# Patient Record
Sex: MALE | Race: BLACK OR AFRICAN AMERICAN | NOT HISPANIC OR LATINO | URBAN - METROPOLITAN AREA
[De-identification: names, ages, dates, MRNs, and addresses within clinical notes are randomized per-mention and may not be internally consistent; named-entity substitution may affect disease eponyms.]

---

## 2024-10-16 ENCOUNTER — INPATIENT (INPATIENT)
Facility: HOSPITAL | Age: 64
LOS: 0 days | Discharge: HOME | DRG: 310 | End: 2024-10-17
Attending: STUDENT IN AN ORGANIZED HEALTH CARE EDUCATION/TRAINING PROGRAM | Admitting: STUDENT IN AN ORGANIZED HEALTH CARE EDUCATION/TRAINING PROGRAM
Payer: COMMERCIAL

## 2024-10-16 VITALS
WEIGHT: 265 LBS | RESPIRATION RATE: 18 BRPM | SYSTOLIC BLOOD PRESSURE: 129 MMHG | HEART RATE: 180 BPM | DIASTOLIC BLOOD PRESSURE: 86 MMHG | TEMPERATURE: 98 F | OXYGEN SATURATION: 98 %

## 2024-10-16 DIAGNOSIS — I10 ESSENTIAL (PRIMARY) HYPERTENSION: ICD-10-CM

## 2024-10-16 DIAGNOSIS — I48.91 UNSPECIFIED ATRIAL FIBRILLATION: ICD-10-CM

## 2024-10-16 LAB
ADD ON TEST-SPECIMEN IN LAB: SIGNIFICANT CHANGE UP
ANION GAP SERPL CALC-SCNC: 11 MMOL/L — SIGNIFICANT CHANGE UP (ref 5–17)
BUN SERPL-MCNC: 16 MG/DL — SIGNIFICANT CHANGE UP (ref 7–23)
CALCIUM SERPL-MCNC: 9 MG/DL — SIGNIFICANT CHANGE UP (ref 8.4–10.5)
CHLORIDE SERPL-SCNC: 105 MMOL/L — SIGNIFICANT CHANGE UP (ref 96–108)
CO2 SERPL-SCNC: 24 MMOL/L — SIGNIFICANT CHANGE UP (ref 22–31)
CREAT SERPL-MCNC: 1.27 MG/DL — SIGNIFICANT CHANGE UP (ref 0.5–1.3)
EGFR: 64 ML/MIN/1.73M2 — SIGNIFICANT CHANGE UP
GLUCOSE SERPL-MCNC: 99 MG/DL — SIGNIFICANT CHANGE UP (ref 70–99)
HCT VFR BLD CALC: 42.6 % — SIGNIFICANT CHANGE UP (ref 39–50)
HGB BLD-MCNC: 14.2 G/DL — SIGNIFICANT CHANGE UP (ref 13–17)
MAGNESIUM SERPL-MCNC: 2 MG/DL — SIGNIFICANT CHANGE UP (ref 1.6–2.6)
MCHC RBC-ENTMCNC: 30.1 PG — SIGNIFICANT CHANGE UP (ref 27–34)
MCHC RBC-ENTMCNC: 33.3 GM/DL — SIGNIFICANT CHANGE UP (ref 32–36)
MCV RBC AUTO: 90.4 FL — SIGNIFICANT CHANGE UP (ref 80–100)
NRBC # BLD: 0 /100 WBCS — SIGNIFICANT CHANGE UP (ref 0–0)
PLATELET # BLD AUTO: 253 K/UL — SIGNIFICANT CHANGE UP (ref 150–400)
POTASSIUM SERPL-MCNC: 3.7 MMOL/L — SIGNIFICANT CHANGE UP (ref 3.5–5.3)
POTASSIUM SERPL-SCNC: 3.7 MMOL/L — SIGNIFICANT CHANGE UP (ref 3.5–5.3)
RBC # BLD: 4.71 M/UL — SIGNIFICANT CHANGE UP (ref 4.2–5.8)
RBC # FLD: 13.1 % — SIGNIFICANT CHANGE UP (ref 10.3–14.5)
SODIUM SERPL-SCNC: 140 MMOL/L — SIGNIFICANT CHANGE UP (ref 135–145)
TROPONIN T, HIGH SENSITIVITY RESULT: <6 NG/L — SIGNIFICANT CHANGE UP (ref 0–51)
WBC # BLD: 7.24 K/UL — SIGNIFICANT CHANGE UP (ref 3.8–10.5)
WBC # FLD AUTO: 7.24 K/UL — SIGNIFICANT CHANGE UP (ref 3.8–10.5)

## 2024-10-16 PROCEDURE — 99223 1ST HOSP IP/OBS HIGH 75: CPT

## 2024-10-16 PROCEDURE — 99291 CRITICAL CARE FIRST HOUR: CPT

## 2024-10-16 PROCEDURE — 71045 X-RAY EXAM CHEST 1 VIEW: CPT | Mod: 26

## 2024-10-16 RX ORDER — METOPROLOL TARTRATE 50 MG
5 TABLET ORAL ONCE
Refills: 0 | Status: COMPLETED | OUTPATIENT
Start: 2024-10-16 | End: 2024-10-16

## 2024-10-16 RX ORDER — METOPROLOL TARTRATE 50 MG
25 TABLET ORAL EVERY 8 HOURS
Refills: 0 | Status: DISCONTINUED | OUTPATIENT
Start: 2024-10-16 | End: 2024-10-17

## 2024-10-16 RX ORDER — APIXABAN 5 MG/1
5 TABLET, FILM COATED ORAL EVERY 12 HOURS
Refills: 0 | Status: DISCONTINUED | OUTPATIENT
Start: 2024-10-16 | End: 2024-10-17

## 2024-10-16 RX ORDER — AMLODIPINE BESYLATE 5 MG
5 TABLET ORAL DAILY
Refills: 0 | Status: DISCONTINUED | OUTPATIENT
Start: 2024-10-17 | End: 2024-10-17

## 2024-10-16 RX ORDER — METOPROLOL TARTRATE 50 MG
50 TABLET ORAL ONCE
Refills: 0 | Status: COMPLETED | OUTPATIENT
Start: 2024-10-16 | End: 2024-10-16

## 2024-10-16 RX ORDER — MAGNESIUM SULFATE 500 MG/ML
4 VIAL (ML) INJECTION ONCE
Refills: 0 | Status: DISCONTINUED | OUTPATIENT
Start: 2024-10-16 | End: 2024-10-16

## 2024-10-16 RX ADMIN — Medication 50 MILLIGRAM(S): at 18:39

## 2024-10-16 RX ADMIN — Medication 40 MILLIEQUIVALENT(S): at 20:42

## 2024-10-16 RX ADMIN — Medication 25 MILLIGRAM(S): at 23:01

## 2024-10-16 RX ADMIN — APIXABAN 5 MILLIGRAM(S): 5 TABLET, FILM COATED ORAL at 21:00

## 2024-10-16 RX ADMIN — Medication 5 MILLIGRAM(S): at 18:00

## 2024-10-16 NOTE — ED PROVIDER NOTE - CLINICAL SUMMARY MEDICAL DECISION MAKING FREE TEXT BOX
Pt w palpitation sensation intermittently x 2 wk, new afib found at card office today.  Pt w rapid afib on arrival; pt given metoprolol w minimal change in hr - ordered for po and will also try magnesium.  Pt discussed and accepted by card fellow; per fellow, will hold on ac at this time (arnold-vasc 0).  Labs w neg trop, cxr wnl.  Will admit.

## 2024-10-16 NOTE — H&P ADULT - PROBLEM SELECTOR PLAN 1
- EKG (10/16/24): __________________  - AC: ____________  - Rate-control: s/p Lopressor 5 mg IVP x1 + Lopressor 50 mg POx1   - Cardiac telemetry, pulse oximetry, VS q4h    DVT ppx:  F: ____   E: Keep K > 4, Mg > 2  N: DASH/TLC     Code: Full  Dispo: pending clinical progression Currently A.fib 100-140s, aymptomatic. [id number] 2  - EKG 10/16/24: Afib w/ RVR @166 bpm w/ PVCs.   - AC: Eliquis 5mg BID  - Rate control: Lopressor 25mg Q8h  - TTE in AM  - EP consult in AM

## 2024-10-16 NOTE — H&P ADULT - NSHPLABSRESULTS_GEN_ALL_CORE
14.2   7.24  )-----------( 253      ( 16 Oct 2024 18:13 )             42.6       10-16    140  |  105  |  16  ----------------------------<  99  3.7   |  24  |  1.27    Ca    9.0      16 Oct 2024 18:13  Mg     2.0     10-16        Urinalysis Basic - ( 16 Oct 2024 18:13 )    Color: x / Appearance: x / SG: x / pH: x  Gluc: 99 mg/dL / Ketone: x  / Bili: x / Urobili: x   Blood: x / Protein: x / Nitrite: x   Leuk Esterase: x / RBC: x / WBC x   Sq Epi: x / Non Sq Epi: x / Bacteria: x

## 2024-10-16 NOTE — ED PROVIDER NOTE - PHYSICAL EXAMINATION
VITAL SIGNS: I have reviewed nursing notes and confirm.  CONSTITUTIONAL:  in no acute distress.   SKIN:  warm and dry, no acute rash.   HEAD:  normocephalic, atraumatic.  EYES: PERRL, EOM intact; conjunctiva and sclera clear.  ENT: No nasal discharge; airway clear.   NECK: Supple; non tender.  CARD: S1, S2 normal; no murmurs, gallops, or rubs. Rapid, irreg irregular rate and rhythm.   RESP:  Clear to auscultation b/l, no wheezes, rales or rhonchi.  ABD: Normal bowel sounds; soft; non-distended; non-tender; no guarding/ rebound.  MSK: Normal ROM. No clubbing, cyanosis or edema. no ttp bilat le  NEURO: Alert, oriented, grossly unremarkable other than facial asymmetry (baseline per pt)  PSYCH: Cooperative, mood and affect appropriate.

## 2024-10-16 NOTE — ED PROVIDER NOTE - PROGRESS NOTE DETAILS
Pt discussed w Dr Ugalde ([facility]) who requests card svc admit.  Pt discussed and accepted by card fellow.

## 2024-10-16 NOTE — H&P ADULT - ASSESSMENT
64 y/o M with PMHx ___________ admitted for new-onset AFIB w/ RVR. 64 y/o M with significant +FHx of HF (brother & mother) and with PMHx HTN and preDM who presented to [facility] ED from outpt cardiologist office for for new onset a.fib, now admitted to cardiac tele for further management  64 y/o M with significant +FHx of HF (brother & mother) and with PMHx HTN and preDM who presented to [facility] ED from outpt cardiologist office for for new onset a.fib, now admitted to cardiac tele for further management.

## 2024-10-16 NOTE — PATIENT PROFILE ADULT - FALL HARM RISK - HARM RISK INTERVENTIONS

## 2024-10-16 NOTE — H&P ADULT - NEUROLOGICAL
Baseline R mouth droop when speaking/sensation intact/responds to verbal commands/no spontaneous movement

## 2024-10-16 NOTE — H&P ADULT - CARDIOVASCULAR
normal/S1 S2 present/no gallops/no rub/no murmur/irregular rate and rhythm/Irregularly irregular rhythm/tachycardia/vascular

## 2024-10-16 NOTE — ED PROVIDER NOTE - NS ED MD DISPO SPECIAL CONSIDERATION1
Care Management Follow Up    Length of Stay (days): 24    Expected Discharge Date: 01/01/2022     Concerns to be Addressed: discharge planning      Patient plan of care discussed at interdisciplinary rounds: Yes    Anticipated Discharge Disposition: TCU recommended- TBD   Anticipated Discharge Services: TBD   Anticipated Discharge DME: TBD     Patient/family educated on Medicare website which has current facility and service quality ratings: yes   Education Provided on the Discharge Plan: yes  Patient/Family in Agreement with the Plan: yes    Referrals Placed by CM/SW:   [facility]  [address] [place], [place] [address] [phone]  12/30: SW left vm with admissions to inquire about open beds.     [facility]  [address] [place] [address] [phone]  12/30: SW spoke with Deepthi in admission and they may have open beds on Monday. They have potential discharges over the weekend and can review pt. Admissions requested referral be sent tomorrow afternoon.     [facility]  [address] [place] [address] [phone]  12/30: PRERNA spoke with Deepthi in admissions and shared that the facility may have an open bed later next week. Deepthi requested SW send referral tomorrow afternoon for review.      Private pay costs discussed: Not applicable    Additional Information:  PRERNA following for dispo needs- per pt's med team, pt may be med ready for discharge on Monday. Provider would like pt's blood pressure to be more stable before discharging.     PRERNA met with pt in room to check in and was able to gather TCU choices. At this time, pt has identified 3 facilities and SW encouraged pt to review list again for more choices. Pt shared that he wants to stay near home and will only consider the three choices. SW provided education on TCUs being full and pt shared that he will consider other options if his top choices are not an  option.     SW initiated referrals and will continue to follow for dispo needs.    GERBER Diaz, SW  6B   [facility]  Phone: [phone]  Pager: [phone]     None

## 2024-10-16 NOTE — ED ADULT NURSE NOTE - OBJECTIVE STATEMENT
Presents per PCP for abnormal EKG- observed new afib. Pt notes intermittent feeling of anxiety in recent weeks, attributed to stress. Otherwise, denies LITTLE/SOB/CP/weakness/dizziness.     Pt upgraded from triage with immediate ED RN and MD team at bedside. Placed on CM, EKG obtained, PIV established with labs sent. Medicated per verbal orders as reflected in MAR. Pt remains on CM, resting on stretcher, stable. Maintains rapid afib 110-150 range. Presents per PCP for abnormal EKG- observed new afib. Pt notes intermittent feeling of anxiety in recent weeks, attributed to stress. Otherwise, denies LITTLE/SOB/CP/weakness/dizziness.     Pt upgraded from triage with immediate ED RN and MD team at bedside. Placed on CM, EKG obtained, PIV established with labs sent. Medicated per verbal orders as reflected in MAR. Pt remains on CM, resting on stretcher, stable. Maintains rapid afib 110-150 range.    +Facial asymmetry- pt notes this is his norm 2* nerve issue x 30 years

## 2024-10-16 NOTE — ED PROVIDER NOTE - OBJECTIVE STATEMENT
63-year-old male history of hypertension, ? bells (reports facial asymmetry x yrs) sent by Dr. iKm for new onset  rapid A-fib.  Patient reports intermittent palpitation sensation that he attributed to possible anxiety that started approximately 2 weeks ago.  He was seen by Dr. Kim  on October 10 with a normal evaluation and came back today to her office for an echocardiogram.  During echocardiogram he was noted to have rapid A-fib without symptoms.  Her preliminary echo showed a normal EF with mild left atrial enlargement.  Patient was sent to the ER for evaluation and treatment of new onset A-fib.  Patient denies chest pain, shortness of breath.  No current palpitation sensation.

## 2024-10-16 NOTE — ED ADULT NURSE NOTE - NSFALLUNIVINTERV_ED_ALL_ED
Bed/Stretcher in lowest position, wheels locked, appropriate side rails in place/Call bell, personal items and telephone in reach/Instruct patient to call for assistance before getting out of bed/chair/stretcher/Non-slip footwear applied when patient is off stretcher/[place] to call system/Physically safe environment - no spills, clutter or unnecessary equipment/Purposeful proactive rounding/Room/bathroom lighting operational, light cord in reach

## 2024-10-16 NOTE — H&P ADULT - PROBLEM SELECTOR PLAN 2
Continue amlodipine 5mg and lisinopril 20mg QD    F: None  E: Replete if K<4 or Mag<2  N: DASH Diet  VTEppx: Eliquis  CODE: FULL  Dispo: Cardiac tele  PT: Not indicated

## 2024-10-16 NOTE — H&P ADULT - NSICDXFAMILYHX_GEN_ALL_CORE_FT
FAMILY HISTORY:  Mother  Still living? Unknown  FH: heart failure, Age at diagnosis: Age Unknown    Sibling  Still living? Unknown  FH: heart failure, Age at diagnosis: Age Unknown

## 2024-10-16 NOTE — H&P ADULT - NS ATTEND AMEND GEN_ALL_CORE FT
62 yo man PMHx of HTN who was admitted for new Afib RVR    Assessment  1. New Afib RVR -> converted to NSR  TTE normal  2. HTN    Plan  1. Metop tartrate 25mg PO Q8H  2. Apixaban 5mg BID for systemic embolization prevention  3. Consulted EP  4. Continue home lisinopril and amlodipine    During non face-to-face time, I reviewed relevant portions of the patient’s medical record. During face-to-face time, I took a relevant history and examined the patient. I also explained differential diagnoses, relevant cardiac diagnoses, workup, and management plan, which required a high level of medical decision making. I answered all questions related to the patient's medical conditions.     Gold Singletary M.D.  Attending Cardiologist  [facility]

## 2024-10-16 NOTE — H&P ADULT - HISTORY OF PRESENT ILLNESS
64 y/o M with PMHx __________ who was sent to ED by PCP for new-onset AFIB. Patient reports that he was at his annual PCP appointment when he felt ________________________. Patient ____ denies CP, SOB, dizziness, palpitations, orthopnea/PND, leg swelling, LOC, bleeding, melena/hematochezia, fever, chills, URI symptoms, or recent illness.     ED Course:   - Vitals: /86 mmHg,  bpm, RR 18 breaths/min, Temp 98.2F, spO2 98% on room air.   - Labs: WBC 7.24, H/H 14.2/42.6, , Na 140, K 3.7, BUN/Cr 16/1.27, Mg __________________ HsTrop T <6  - Diagnostics:        o EKG (10/16/24): Afib w/ RVR @166 bpm w/ PVCs  - Interventions: Lopressor 5 mg IVPx1, Lopressor 50 mg POx1  64 y/o M with significant +FHx of HF (brother & mother) and with PMHx HTN and preDM who was sent to ED by PCP for new-onset atrial fibrillation. Pt described a few months of intermittent "anxiety" symptoms with some mild brief episodes of shortness of breath, denies any associated chest pain. He was referred to a cardiologist for further evaluation when today while getting an echocardiogram he was noted to be in new onset a.fib with RVR, asymptomatic He does note of increased stressors at work, as he works as a  at [employer]. No EtOH, illicit drug, or caffeine use. He otherwise denies any palpitations, dizziness, syncope, diaphoresis, fatigue, LE edema, SOB, LITTLE, orthopnea, PND, N/V/D, abd pain, cough, congestion, fever, chills or recent sick contact. He notes of a remote hx of stress test that was reportedly normal.     ED Course:   - Vitals: /86 mmHg,  bpm, RR 18 breaths/min, Temp 98.2F, spO2 98% on room air.   - Labs: WBC 7.24, H/H 14.2/42.6, , Na 140, K 3.7, BUN/Cr 16/1.27, Mg 2.0, HsTrop T <6  - Diagnostics:        o EKG (10/16/24): Afib w/ RVR @166 bpm w/ PVCs  - Interventions: Lopressor 5 mg IVPx1, Lopressor 50 mg POx1     Pt now admitted to cardiac tele for further management of new onset a.fib with RVR.  64 y/o M with significant +FHx of HF (brother & mother) and with PMHx HTN and preDM who was sent to ED by PCP for new-onset atrial fibrillation. Pt described a few months of intermittent "anxiety" symptoms with some mild brief episodes of shortness of breath, denies any associated chest pain. He was referred to a cardiologist for further evaluation when today while getting an echocardiogram he was noted to be in new onset a.fib with RVR, asymptomatic. He does note of increased stressors at work, as he works as a  at [employer]. No EtOH, illicit drug, or caffeine use. He otherwise denies any palpitations, dizziness, syncope, diaphoresis, fatigue, LE edema, SOB, LITTLE, orthopnea, PND, N/V/D, abd pain, cough, congestion, fever, chills or recent sick contact. He notes of a remote hx of stress test that was reportedly normal.     ED Course:   - Vitals: /86 mmHg,  bpm, RR 18 breaths/min, Temp 98.2F, spO2 98% on room air.   - Labs: WBC 7.24, H/H 14.2/42.6, , Na 140, K 3.7, BUN/Cr 16/1.27, Mg 2.0, HsTrop T <6  - Diagnostics:        o EKG (10/16/24): Afib w/ RVR @166 bpm w/ PVCs  - Interventions: Lopressor 5 mg IVPx1, Lopressor 50 mg POx1     Pt now admitted to cardiac tele for further management of new onset a.fib with RVR.

## 2024-10-16 NOTE — ED ADULT NURSE REASSESSMENT NOTE - NS ED NURSE REASSESS COMMENT FT1
Received verbal endorsement from Kobe HEADLEY.  Pt awake, AOX3. Pt denies any complains, denies any pain. Pt denies any pain. What Type Of Note Output Would You Prefer (Optional)?: Bullet Format How Severe Is Your Skin Lesion?: moderate Has Your Skin Lesion Been Treated?: not been treated Is This A New Presentation, Or A Follow-Up?: Skin Lesion

## 2024-10-17 ENCOUNTER — TRANSCRIPTION ENCOUNTER (OUTPATIENT)
Age: 64
End: 2024-10-17

## 2024-10-17 ENCOUNTER — RESULT REVIEW (OUTPATIENT)
Age: 64
End: 2024-10-17

## 2024-10-17 VITALS
TEMPERATURE: 99 F | RESPIRATION RATE: 16 BRPM | SYSTOLIC BLOOD PRESSURE: 122 MMHG | OXYGEN SATURATION: 100 % | DIASTOLIC BLOOD PRESSURE: 79 MMHG | HEART RATE: 52 BPM

## 2024-10-17 LAB
A1C WITH ESTIMATED AVERAGE GLUCOSE RESULT: 5.6 % — SIGNIFICANT CHANGE UP (ref 4–5.6)
ANION GAP SERPL CALC-SCNC: 7 MMOL/L — SIGNIFICANT CHANGE UP (ref 5–17)
APTT BLD: 35.5 SEC — SIGNIFICANT CHANGE UP (ref 24.5–35.6)
BUN SERPL-MCNC: 17 MG/DL — SIGNIFICANT CHANGE UP (ref 7–23)
CALCIUM SERPL-MCNC: 8.8 MG/DL — SIGNIFICANT CHANGE UP (ref 8.4–10.5)
CHLORIDE SERPL-SCNC: 107 MMOL/L — SIGNIFICANT CHANGE UP (ref 96–108)
CHOLEST SERPL-MCNC: 130 MG/DL — SIGNIFICANT CHANGE UP
CO2 SERPL-SCNC: 27 MMOL/L — SIGNIFICANT CHANGE UP (ref 22–31)
CREAT SERPL-MCNC: 1.28 MG/DL — SIGNIFICANT CHANGE UP (ref 0.5–1.3)
EGFR: 63 ML/MIN/1.73M2 — SIGNIFICANT CHANGE UP
ESTIMATED AVERAGE GLUCOSE: 114 MG/DL — SIGNIFICANT CHANGE UP (ref 68–114)
GLUCOSE SERPL-MCNC: 88 MG/DL — SIGNIFICANT CHANGE UP (ref 70–99)
HCT VFR BLD CALC: 39.3 % — SIGNIFICANT CHANGE UP (ref 39–50)
HDLC SERPL-MCNC: 43 MG/DL — SIGNIFICANT CHANGE UP
HGB BLD-MCNC: 13.1 G/DL — SIGNIFICANT CHANGE UP (ref 13–17)
INR BLD: 1.18 — HIGH (ref 0.85–1.16)
LIPID PNL WITH DIRECT LDL SERPL: 74 MG/DL — SIGNIFICANT CHANGE UP
MAGNESIUM SERPL-MCNC: 2.2 MG/DL — SIGNIFICANT CHANGE UP (ref 1.6–2.6)
MCHC RBC-ENTMCNC: 30.8 PG — SIGNIFICANT CHANGE UP (ref 27–34)
MCHC RBC-ENTMCNC: 33.3 GM/DL — SIGNIFICANT CHANGE UP (ref 32–36)
MCV RBC AUTO: 92.3 FL — SIGNIFICANT CHANGE UP (ref 80–100)
NON HDL CHOLESTEROL: 87 MG/DL — SIGNIFICANT CHANGE UP
NRBC # BLD: 0 /100 WBCS — SIGNIFICANT CHANGE UP (ref 0–0)
PLATELET # BLD AUTO: 221 K/UL — SIGNIFICANT CHANGE UP (ref 150–400)
POTASSIUM SERPL-MCNC: 4.2 MMOL/L — SIGNIFICANT CHANGE UP (ref 3.5–5.3)
POTASSIUM SERPL-SCNC: 4.2 MMOL/L — SIGNIFICANT CHANGE UP (ref 3.5–5.3)
PROTHROM AB SERPL-ACNC: 13.8 SEC — HIGH (ref 9.9–13.4)
RBC # BLD: 4.26 M/UL — SIGNIFICANT CHANGE UP (ref 4.2–5.8)
RBC # FLD: 13.4 % — SIGNIFICANT CHANGE UP (ref 10.3–14.5)
SODIUM SERPL-SCNC: 141 MMOL/L — SIGNIFICANT CHANGE UP (ref 135–145)
T4 AB SER-ACNC: 6.13 UG/DL — SIGNIFICANT CHANGE UP (ref 4.5–11.7)
TRIGL SERPL-MCNC: 61 MG/DL — SIGNIFICANT CHANGE UP
TSH SERPL-MCNC: 2.18 UIU/ML — SIGNIFICANT CHANGE UP (ref 0.27–4.2)
WBC # BLD: 6.4 K/UL — SIGNIFICANT CHANGE UP (ref 3.8–10.5)
WBC # FLD AUTO: 6.4 K/UL — SIGNIFICANT CHANGE UP (ref 3.8–10.5)

## 2024-10-17 PROCEDURE — 85027 COMPLETE CBC AUTOMATED: CPT

## 2024-10-17 PROCEDURE — 99222 1ST HOSP IP/OBS MODERATE 55: CPT

## 2024-10-17 PROCEDURE — 93010 ELECTROCARDIOGRAM REPORT: CPT

## 2024-10-17 PROCEDURE — 96374 THER/PROPH/DIAG INJ IV PUSH: CPT

## 2024-10-17 PROCEDURE — 85610 PROTHROMBIN TIME: CPT

## 2024-10-17 PROCEDURE — 80061 LIPID PANEL: CPT

## 2024-10-17 PROCEDURE — 36415 COLL VENOUS BLD VENIPUNCTURE: CPT

## 2024-10-17 PROCEDURE — 93005 ELECTROCARDIOGRAM TRACING: CPT

## 2024-10-17 PROCEDURE — 71045 X-RAY EXAM CHEST 1 VIEW: CPT

## 2024-10-17 PROCEDURE — 99239 HOSP IP/OBS DSCHRG MGMT >30: CPT

## 2024-10-17 PROCEDURE — 84436 ASSAY OF TOTAL THYROXINE: CPT

## 2024-10-17 PROCEDURE — 93306 TTE W/DOPPLER COMPLETE: CPT

## 2024-10-17 PROCEDURE — 80048 BASIC METABOLIC PNL TOTAL CA: CPT

## 2024-10-17 PROCEDURE — 84484 ASSAY OF TROPONIN QUANT: CPT

## 2024-10-17 PROCEDURE — 93306 TTE W/DOPPLER COMPLETE: CPT | Mod: 26

## 2024-10-17 PROCEDURE — 85730 THROMBOPLASTIN TIME PARTIAL: CPT

## 2024-10-17 PROCEDURE — 83036 HEMOGLOBIN GLYCOSYLATED A1C: CPT

## 2024-10-17 PROCEDURE — 84443 ASSAY THYROID STIM HORMONE: CPT

## 2024-10-17 PROCEDURE — 99291 CRITICAL CARE FIRST HOUR: CPT

## 2024-10-17 PROCEDURE — 83735 ASSAY OF MAGNESIUM: CPT

## 2024-10-17 RX ORDER — APIXABAN 5 MG/1
1 TABLET, FILM COATED ORAL
Qty: 60 | Refills: 0
Start: 2024-10-17 | End: 2024-11-15

## 2024-10-17 RX ORDER — DRONEDARONE 400 MG/1
1 TABLET, FILM COATED ORAL
Qty: 60 | Refills: 0
Start: 2024-10-17 | End: 2024-11-15

## 2024-10-17 RX ORDER — DRONEDARONE 400 MG/1
400 TABLET, FILM COATED ORAL
Refills: 0 | Status: DISCONTINUED | OUTPATIENT
Start: 2024-10-17 | End: 2024-10-17

## 2024-10-17 RX ADMIN — APIXABAN 5 MILLIGRAM(S): 5 TABLET, FILM COATED ORAL at 05:41

## 2024-10-17 RX ADMIN — Medication 25 MILLIGRAM(S): at 12:15

## 2024-10-17 RX ADMIN — Medication 25 MILLIGRAM(S): at 05:49

## 2024-10-17 RX ADMIN — DRONEDARONE 400 MILLIGRAM(S): 400 TABLET, FILM COATED ORAL at 16:31

## 2024-10-17 RX ADMIN — Medication 5 MILLIGRAM(S): at 05:41

## 2024-10-17 RX ADMIN — APIXABAN 5 MILLIGRAM(S): 5 TABLET, FILM COATED ORAL at 16:31

## 2024-10-17 NOTE — DISCHARGE NOTE PROVIDER - PROVIDER TOKENS
PROVIDER:[TOKEN:[[id number]],FOLLOWUP:[2 weeks],ESTABLISHEDPATIENT:[T]] PROVIDER:[TOKEN:[[id number]],FOLLOWUP:[1 month]],FREE:[LAST:[Jagger],FIRST:[Lori],PHONE:[phone]],FAX:[(   )    -],ADDRESS:[Cardiologist  [address] [place] [address]],FOLLOWUP:[2 weeks],ESTABLISHEDPATIENT:[T]]

## 2024-10-17 NOTE — DISCHARGE NOTE PROVIDER - NSDCCPTREATMENT_GEN_ALL_CORE_FT
PRINCIPAL PROCEDURE  Procedure: 2D echocardiography  Findings and Treatment: CONCLUSIONS:   1. Patient was in atrial fibrillation during the study with frequent ectopy.   2. Normal left and right ventricular size and systolic function.   3. Trace aortic regurgitation.   4. No evidence of pulmonary hypertension.   5. No pericardial effusion.

## 2024-10-17 NOTE — DISCHARGE NOTE PROVIDER - CARE PROVIDER_API CALL
Lori Kim J  Cardiovascular Disease  [address] [place] [address]  Phone: [phone]  Fax: [phone]  Established Patient  Follow Up Time: 2 weeks   Golden Harper  Cardiac Electrophysiology  [address] [address]  Phone: [phone]  Fax: [phone]  Follow Up Time: 1 month    Lori Kim  Cardiologist  [address] [place] [address]  Phone: [phone]  Fax: (   )    -  Established Patient  Follow Up Time: 2 weeks

## 2024-10-17 NOTE — DISCHARGE NOTE PROVIDER - CARE PROVIDERS DIRECT ADDRESSES
,[email or web address],DirectAddress_Unknown

## 2024-10-17 NOTE — DISCHARGE NOTE PROVIDER - NSDCMRMEDTOKEN_GEN_ALL_CORE_FT
amlodipine-benazepril 5 mg-20 mg oral capsule: 1 cap(s) orally once a day  apixaban 5 mg oral tablet: 1 tab(s) orally every 12 hours   amlodipine-benazepril 5 mg-20 mg oral capsule: 1 cap(s) orally once a day  apixaban 5 mg oral tablet: 1 tab(s) orally every 12 hours  dronedarone 400 mg oral tablet: 1 tab(s) orally 2 times a day

## 2024-10-17 NOTE — CONSULT NOTE ADULT - SUBJECTIVE AND OBJECTIVE BOX
HPI:    63 year old male, history of HTN, pre-DM, who presents with newly diagnosed atrial fibrillation.    Pt has been having intermittent symptoms of "anxiety" for the past few months, usually lasting a few hours and self limiting. Pt saw his PCP for routine follow up and referred pt to see Dr. Kim cardiologist. Had an EKG on 10/10 which was "normal" per the patient. Went to see cardiologist and was found to be in newly diagnosed AF with RVR 160s, referred to ED for management. Given metoprolol 25mg PO q8hr , rates now     PAST MEDICAL & SURGICAL HISTORY:  HTN (hypertension)      Prediabetes          FH: heart failure (Mother, Sibling)        Social History:no smoking, no drugs, no algohol    pertinent home medications:    Inpatient Medications:   amLODIPine   Tablet 5 milliGRAM(s) Oral daily  apixaban 5 milliGRAM(s) Oral every 12 hours  lisinopril 20 milliGRAM(s) Oral daily  metoprolol tartrate 25 milliGRAM(s) Oral every 8 hours      Allergies: No Known Allergies      ROS:   CONSTITUTIONAL: No fever, weight loss + fatigue  EYES: Pt denies  RESPIRATORY: No cough, wheezing, chills or hemoptysis; No Shortness of Breath  CARDIOVASCULAR: see HPI  GASTROINTESTINAL: Pt denies  NEUROLOGICAL: Pt denies  SKIN: Pt denies   PSYCHIATRIC: Pt denies  HEME/LYMPH: Pt denies    PHYSICAL:  T(C): 36.4 (10-17-24 @ 08:37), Max: 37.1 (10-16-24 @ 20:11)  HR: 97 (10-17-24 @ 08:37) (78 - 180)  BP: 128/83 (10-17-24 @ 08:37) (125/81 - 156/77)  RR: 17 (10-17-24 @ 08:37) (16 - 18)  SpO2: 100% (10-17-24 @ 08:37) (98% - 100%)  Wt(kg): --  Appearance: No acute distress, well developed  Eyes: normal appearing conjunctiva, pupils and eyelids  Cardiovascular: Normal S1 S2, No JVD, No murmurs, No edema  Respiratory: Lungs clear to auscultation	bilaterally.  No wheeze, rhonchi, rales noted  Gastrointestinal:  Soft, NT/ND 	  Neurologic:  No deficit noted  Psych: A&Ox3, normal mood/affect  Musculoskeletal: normal gait  Skin: no rash noted, normal color and pigmentation.        LABS:                        13.1   6.40  )-----------( 221      ( 17 Oct 2024 05:30 )             39.3     10-17    141  |  107  |  17  ----------------------------<  88  4.2   |  27  |  1.28    Ca    8.8      17 Oct 2024 05:30  Mg     2.2     10-17      PT/INR - ( 17 Oct 2024 05:30 )   PT: 13.8 sec;   INR: 1.18          PTT - ( 17 Oct 2024 05:30 )  PTT:35.5 sec  TSH  Troponin    EKG:    Telemetry:    ECHO:    Prior EP procedures:    Cath / stress / Cardiac CTa:    Assessment Plan:         HPI:    63 year old male, history of HTN, pre-DM, who presents with newly diagnosed atrial fibrillation.    Pt has been having intermittent symptoms of "anxiety" for the past few months, usually lasting a few hours and self limiting. Pt saw his PCP for routine follow up and referred pt to see Dr. Kim cardiologist. Had an EKG on 10/10 which was "normal" per the patient. Went to see cardiologist and was found to be in newly diagnosed AF with RVR 160s, referred to ED for management. Given metoprolol 25mg PO q8hr , rates now improved in the 80s.    Telemetry AFib with frequent runs of rapid rates that conduct aberrantly leading to runs of what looks like WCT, very irregularly irregular and occurs when rates are rapid, consistent with Breanne's phenomenon    PAST MEDICAL & SURGICAL HISTORY:  HTN (hypertension)      Prediabetes          FH: heart failure (Mother, Sibling)        Social History: no smoking, no drugs, no etoh     pertinent home medications:    Inpatient Medications:   amLODIPine   Tablet 5 milliGRAM(s) Oral daily  apixaban 5 milliGRAM(s) Oral every 12 hours  lisinopril 20 milliGRAM(s) Oral daily  metoprolol tartrate 25 milliGRAM(s) Oral every 8 hours      Allergies: No Known Allergies      ROS:   CONSTITUTIONAL: No fever, weight loss + fatigue  EYES: Pt denies  RESPIRATORY: No cough, wheezing, chills or hemoptysis; No Shortness of Breath  CARDIOVASCULAR: see HPI  GASTROINTESTINAL: Pt denies  NEUROLOGICAL: Pt denies  SKIN: Pt denies   PSYCHIATRIC: Pt denies  HEME/LYMPH: Pt denies    PHYSICAL:  T(C): 36.4 (10-17-24 @ 08:37), Max: 37.1 (10-16-24 @ 20:11)  HR: 97 (10-17-24 @ 08:37) (78 - 180)  BP: 128/83 (10-17-24 @ 08:37) (125/81 - 156/77)  RR: 17 (10-17-24 @ 08:37) (16 - 18)  SpO2: 100% (10-17-24 @ 08:37) (98% - 100%)  Appearance: No acute distress, well developed  Eyes: normal appearing conjunctiva, pupils and eyelids  Cardiovascular: irregularly irregular   Respiratory: Lungs clear to auscultation	bilaterally.  No wheeze, rhonchi, rales noted  Gastrointestinal:  Soft, NT/ND 	  Neurologic:  No deficit noted  Psych: A&Ox3, normal mood/affect  Musculoskeletal: normal gait  Skin: no rash noted, normal color and pigmentation.        LABS:                        13.1   6.40  )-----------( 221      ( 17 Oct 2024 05:30 )             39.3     10-17    141  |  107  |  17  ----------------------------<  88  4.2   |  27  |  1.28    Ca    8.8      17 Oct 2024 05:30  Mg     2.2     10-17      PT/INR - ( 17 Oct 2024 05:30 )   PT: 13.8 sec;   INR: 1.18          PTT - ( 17 Oct 2024 05:30 )  PTT:35.5 sec    ECHO:  1. Patient was in atrial fibrillation during the study with frequent ectopy.   2. Normal left and right ventricular size and systolic function.   3. Trace aortic regurgitation.   4. No evidence of pulmonary hypertension.   5. No pericardial effusion.    Assessment Plan:  63 year old male, history of HTN, pre-DM, who presents with newly diagnosed atrial fibrillation. EKG/tele AF with occasional abberrancy. ECHO WNL.    -keep NPO  -DOAC- pt initiated on eliquis 5mg PO BID  -can dc home on metoprolol  -plan for JACK DCCV. Given frequency of symptoms, will consider initiation of AAD post cardioversion. Pt would benefit from ablation in the near future.

## 2024-10-17 NOTE — DISCHARGE NOTE PROVIDER - NSDCFUADDAPPT_GEN_ALL_CORE_FT
1. Follow up with Electrophysiologist Dr Harper, the office will call you to arrange for an appointment within 4-6 weeks.

## 2024-10-17 NOTE — DISCHARGE NOTE NURSING/CASE MANAGEMENT/SOCIAL WORK - NSDCPEFALRISK_GEN_ALL_CORE
For information on Fall & Injury Prevention, visit: [email or web address] OR  [email or web address] OR  https://www.cdc.gov/steadi/patient.html

## 2024-10-17 NOTE — DISCHARGE NOTE PROVIDER - NSDCCPCAREPLAN_GEN_ALL_CORE_FT
PRINCIPAL DISCHARGE DIAGNOSIS  Diagnosis: Atrial fibrillation with RVR  Assessment and Plan of Treatment:      PRINCIPAL DISCHARGE DIAGNOSIS  Diagnosis: Atrial fibrillation with RVR  Assessment and Plan of Treatment: During your hospitalization, you were found to have a new onset of an arrhythmia called atrial fibrillation (A-fib). You spontaneously converted back to normal rhythm. In people with A-fib, the electrical signals that control the heartbeat are abnormal. As a result, the top 2 chambers of the heart stop pumping effectively, and a small amount of the blood that should move out of these chambers gets left behind. As the blood pools, it can start to form clots. These clots can travel to the brain through the blood vessels, and cause strokes. You were started on a medication called Multaq (dronedarone) to help maintain a normal rhythm, and blood thinner Eliquis to prevent the risk of stroke associated with A-fib. Monitor for signs and symptoms of bleeding such as black or bright red blood in stool, nosebleeds, difficulty stopping bleeding.

## 2024-10-17 NOTE — DISCHARGE NOTE NURSING/CASE MANAGEMENT/SOCIAL WORK - PATIENT PORTAL LINK FT
You can access the FollowMyHealth Patient Portal offered by [facility] by registering at the following website: [email or web address]. By joining [facility]’s FollowMyHealth portal, you will also be able to view your health information using other applications (apps) compatible with our system.

## 2024-10-17 NOTE — DISCHARGE NOTE NURSING/CASE MANAGEMENT/SOCIAL WORK - FINANCIAL ASSISTANCE
[facility] provides services at a reduced cost to those who are determined to be eligible through [facility]’s financial assistance program. Information regarding [facility]’s financial assistance program can be found by going to [email or web address] or by calling [phone].

## 2024-10-17 NOTE — DISCHARGE NOTE NURSING/CASE MANAGEMENT/SOCIAL WORK - NSDCVIVACCINE_GEN_ALL_CORE_FT
No Vaccines Administered.

## 2024-10-17 NOTE — DISCHARGE NOTE PROVIDER - HOSPITAL COURSE
62 y/o M with significant +FHx of HF (brother & mother) and with PMHx HTN and preDM, was sent to ED by cardiologist for new-onset atrial fibrillation RVR. Pt reports feeling a few months of intermittent "anxiety" symptoms with some mild brief episodes of SOB w/ chest fluttering sensation that was worsening x 2 weeks. In ED: EKG w/ Afib w/ RVR @166 bpm w/ PVCs. Hs Trop T neg x1. Admitted to cardiac tele for new onset AFib with RVR. Pt was started on Lopressor 25mg q8h w/ improved HR 80-120s. CHADsVASc 1, started on Eliquis 5mg BID. EP consulted. Initially planned for JACK/DCCV, but pt self converted to SB 50s prior to receiving sedation for JACK.  Per EP, started Multaq 400mg BID given frequency of symptoms. Echo 10/17/24 w/ EF 55-60%, septal knuckle, no RWMA, trace AI.    On the day of discharge, the patient was seen and examined. Symptoms improved. Vital signs are stable. Labs and imaging reviewed. Patient is medically optimized and hemodynamically stable. Return precautions discussed, medication teach back done, and importance of physician followup emphasized. The patient verbalized understanding.   62 y/o M with significant +FHx of HF (brother & mother) and with PMHx HTN and preDM, was sent to ED by cardiologist for new-onset atrial fibrillation RVR. Pt reports feeling a few months of intermittent "anxiety" symptoms with some mild brief episodes of SOB w/ chest fluttering sensation that was worsening x 2 weeks. In ED: EKG w/ Afib w/ RVR @166 bpm w/ PVCs. Hs Trop T neg x1. Admitted to cardiac tele for new onset AFib with RVR. Pt was started on Lopressor 25mg q8h w/ improved HR 80-120s. CHADsVASc 1, started on Eliquis 5mg BID ($45/month) . EP consulted. Initially planned for JACK/DCCV, but pt self converted to SB 50s prior to receiving sedation for JACK.  Per EP, started Multaq 400mg BID ($45/month) given frequency of symptoms. Echo 10/17/24 w/ EF 55-60%, septal knuckle, no RWMA, trace AI.    On the day of discharge, the patient was seen and examined. Symptoms improved. Vital signs are stable. Labs and imaging reviewed. Patient is medically optimized and hemodynamically stable. Return precautions discussed, medication teach back done, and importance of physician followup emphasized. The patient verbalized understanding.

## 2024-10-22 RX ORDER — AMLODIPINE BESYLATE AND BENAZEPRIL HYDROCHLORIDE 10; 20 MG/1; MG/1
1 CAPSULE ORAL
Refills: 0 | DISCHARGE

## 2024-10-23 DIAGNOSIS — I10 ESSENTIAL (PRIMARY) HYPERTENSION: ICD-10-CM

## 2024-10-23 DIAGNOSIS — I48.91 UNSPECIFIED ATRIAL FIBRILLATION: ICD-10-CM

## 2024-10-23 DIAGNOSIS — R73.03 PREDIABETES: ICD-10-CM

## 2024-10-23 DIAGNOSIS — Z82.49 FAMILY HISTORY OF ISCHEMIC HEART DISEASE AND OTHER DISEASES OF THE CIRCULATORY SYSTEM: ICD-10-CM

## 2024-10-31 PROBLEM — R73.03 PREDIABETES: Chronic | Status: ACTIVE | Noted: 2024-10-16

## 2024-10-31 PROBLEM — I10 ESSENTIAL (PRIMARY) HYPERTENSION: Chronic | Status: ACTIVE | Noted: 2024-10-16

## 2024-11-20 ENCOUNTER — APPOINTMENT (OUTPATIENT)
Dept: HEART AND VASCULAR | Facility: CLINIC | Age: 64
End: 2024-11-20

## 2025-02-05 ENCOUNTER — NON-APPOINTMENT (OUTPATIENT)
Age: 65
End: 2025-02-05

## 2025-02-05 ENCOUNTER — APPOINTMENT (OUTPATIENT)
Dept: HEART AND VASCULAR | Facility: CLINIC | Age: 65
End: 2025-02-05

## 2025-02-05 VITALS
HEIGHT: 78 IN | WEIGHT: 279 LBS | DIASTOLIC BLOOD PRESSURE: 80 MMHG | BODY MASS INDEX: 32.28 KG/M2 | TEMPERATURE: 97.1 F | HEART RATE: 71 BPM | SYSTOLIC BLOOD PRESSURE: 111 MMHG | OXYGEN SATURATION: 97 %

## 2025-02-05 DIAGNOSIS — Z78.9 OTHER SPECIFIED HEALTH STATUS: ICD-10-CM

## 2025-02-05 DIAGNOSIS — I48.0 PAROXYSMAL ATRIAL FIBRILLATION: ICD-10-CM

## 2025-02-05 PROCEDURE — 99214 OFFICE O/P EST MOD 30 MIN: CPT

## 2025-02-05 PROCEDURE — 93000 ELECTROCARDIOGRAM COMPLETE: CPT

## 2025-02-11 PROBLEM — Z78.9 DOES NOT USE ILLICIT DRUGS: Status: ACTIVE | Noted: 2025-02-11

## 2025-02-11 PROBLEM — I48.0 PAROXYSMAL ATRIAL FIBRILLATION: Status: ACTIVE | Noted: 2025-02-11

## 2025-02-13 NOTE — DISCHARGE NOTE NURSING/CASE MANAGEMENT/SOCIAL WORK - NSDPLANG ASIS_GEN_ALL_CORE
Mom calling to see if you can prescribe the tamiflu for patient mom stated provider said she could  [id number] - [place], [place] - [address] AT [facility] [address]  [address]  [facility] [address]  Phone: [phone] Fax: [phone]      No

## 2025-04-30 ENCOUNTER — NON-APPOINTMENT (OUTPATIENT)
Age: 65
End: 2025-04-30

## 2025-04-30 ENCOUNTER — APPOINTMENT (OUTPATIENT)
Dept: HEART AND VASCULAR | Facility: CLINIC | Age: 65
End: 2025-04-30
Payer: COMMERCIAL

## 2025-04-30 VITALS
SYSTOLIC BLOOD PRESSURE: 111 MMHG | OXYGEN SATURATION: 98 % | WEIGHT: 274 LBS | TEMPERATURE: 97.7 F | DIASTOLIC BLOOD PRESSURE: 72 MMHG | HEIGHT: 78 IN | BODY MASS INDEX: 31.7 KG/M2 | HEART RATE: 73 BPM

## 2025-04-30 DIAGNOSIS — I48.0 PAROXYSMAL ATRIAL FIBRILLATION: ICD-10-CM

## 2025-04-30 PROCEDURE — 93000 ELECTROCARDIOGRAM COMPLETE: CPT | Mod: 59

## 2025-04-30 PROCEDURE — 93246 EXT ECG>7D<15D RECORDING: CPT

## 2025-04-30 PROCEDURE — 99214 OFFICE O/P EST MOD 30 MIN: CPT

## 2025-06-03 PROCEDURE — 93248 EXT ECG>7D<15D REV&INTERPJ: CPT

## 2025-06-11 ENCOUNTER — APPOINTMENT (OUTPATIENT)
Dept: HEART AND VASCULAR | Facility: CLINIC | Age: 65
End: 2025-06-11
Payer: COMMERCIAL

## 2025-06-11 VITALS
HEIGHT: 78 IN | SYSTOLIC BLOOD PRESSURE: 124 MMHG | WEIGHT: 273 LBS | DIASTOLIC BLOOD PRESSURE: 85 MMHG | HEART RATE: 85 BPM | BODY MASS INDEX: 31.59 KG/M2 | OXYGEN SATURATION: 97 % | TEMPERATURE: 97.89 F

## 2025-06-11 PROCEDURE — 93000 ELECTROCARDIOGRAM COMPLETE: CPT

## 2025-06-11 PROCEDURE — 99214 OFFICE O/P EST MOD 30 MIN: CPT

## 2025-07-24 ENCOUNTER — INPATIENT (INPATIENT)
Facility: HOSPITAL | Age: 65
LOS: 0 days | Discharge: HOME | End: 2025-07-25
Attending: INTERNAL MEDICINE | Admitting: INTERNAL MEDICINE
Payer: COMMERCIAL

## 2025-07-24 VITALS — WEIGHT: 272.05 LBS | HEIGHT: 78 IN

## 2025-07-24 LAB
ANION GAP SERPL CALC-SCNC: 12 MMOL/L — SIGNIFICANT CHANGE UP (ref 5–17)
APTT BLD: 45.7 SEC — HIGH (ref 26.1–36.8)
BLD GP AB SCN SERPL QL: NEGATIVE — SIGNIFICANT CHANGE UP
BUN SERPL-MCNC: 14 MG/DL — SIGNIFICANT CHANGE UP (ref 7–23)
CALCIUM SERPL-MCNC: 9.4 MG/DL — SIGNIFICANT CHANGE UP (ref 8.4–10.5)
CHLORIDE SERPL-SCNC: 102 MMOL/L — SIGNIFICANT CHANGE UP (ref 96–108)
CO2 SERPL-SCNC: 25 MMOL/L — SIGNIFICANT CHANGE UP (ref 22–31)
CREAT SERPL-MCNC: 1.28 MG/DL — SIGNIFICANT CHANGE UP (ref 0.5–1.3)
EGFR: 63 ML/MIN/1.73M2 — SIGNIFICANT CHANGE UP
EGFR: 63 ML/MIN/1.73M2 — SIGNIFICANT CHANGE UP
GLUCOSE SERPL-MCNC: 91 MG/DL — SIGNIFICANT CHANGE UP (ref 70–99)
HCT VFR BLD CALC: 43.7 % — SIGNIFICANT CHANGE UP (ref 39–50)
HGB BLD-MCNC: 14.7 G/DL — SIGNIFICANT CHANGE UP (ref 13–17)
INR BLD: 1.37 — HIGH (ref 0.85–1.16)
MCHC RBC-ENTMCNC: 30.9 PG — SIGNIFICANT CHANGE UP (ref 27–34)
MCHC RBC-ENTMCNC: 33.6 G/DL — SIGNIFICANT CHANGE UP (ref 32–36)
MCV RBC AUTO: 91.8 FL — SIGNIFICANT CHANGE UP (ref 80–100)
NRBC # BLD AUTO: 0 K/UL — SIGNIFICANT CHANGE UP (ref 0–0)
NRBC # FLD: 0 K/UL — SIGNIFICANT CHANGE UP (ref 0–0)
NRBC BLD AUTO-RTO: 0 /100 WBCS — SIGNIFICANT CHANGE UP (ref 0–0)
PLATELET # BLD AUTO: 229 K/UL — SIGNIFICANT CHANGE UP (ref 150–400)
PMV BLD: 9.6 FL — SIGNIFICANT CHANGE UP (ref 7–13)
POTASSIUM SERPL-MCNC: 4.1 MMOL/L — SIGNIFICANT CHANGE UP (ref 3.5–5.3)
POTASSIUM SERPL-SCNC: 4.1 MMOL/L — SIGNIFICANT CHANGE UP (ref 3.5–5.3)
PROTHROM AB SERPL-ACNC: 15.7 SEC — HIGH (ref 9.9–13.4)
RBC # BLD: 4.76 M/UL — SIGNIFICANT CHANGE UP (ref 4.2–5.8)
RBC # FLD: 12.7 % — SIGNIFICANT CHANGE UP (ref 10.3–14.5)
RH IG SCN BLD-IMP: POSITIVE — SIGNIFICANT CHANGE UP
RH IG SCN BLD-IMP: POSITIVE — SIGNIFICANT CHANGE UP
SODIUM SERPL-SCNC: 139 MMOL/L — SIGNIFICANT CHANGE UP (ref 135–145)
WBC # BLD: 5.03 K/UL — SIGNIFICANT CHANGE UP (ref 3.8–10.5)
WBC # FLD AUTO: 5.03 K/UL — SIGNIFICANT CHANGE UP (ref 3.8–10.5)

## 2025-07-24 PROCEDURE — 93656 COMPRE EP EVAL ABLTJ ATR FIB: CPT

## 2025-07-24 PROCEDURE — 85610 PROTHROMBIN TIME: CPT

## 2025-07-24 PROCEDURE — 82565 ASSAY OF CREATININE: CPT

## 2025-07-24 PROCEDURE — 85730 THROMBOPLASTIN TIME PARTIAL: CPT

## 2025-07-24 PROCEDURE — 85347 COAGULATION TIME ACTIVATED: CPT

## 2025-07-24 PROCEDURE — 82330 ASSAY OF CALCIUM: CPT

## 2025-07-24 PROCEDURE — 85027 COMPLETE CBC AUTOMATED: CPT

## 2025-07-24 PROCEDURE — 82947 ASSAY GLUCOSE BLOOD QUANT: CPT

## 2025-07-24 PROCEDURE — 93010 ELECTROCARDIOGRAM REPORT: CPT

## 2025-07-24 PROCEDURE — 80048 BASIC METABOLIC PNL TOTAL CA: CPT

## 2025-07-24 PROCEDURE — 84295 ASSAY OF SERUM SODIUM: CPT

## 2025-07-24 PROCEDURE — 84132 ASSAY OF SERUM POTASSIUM: CPT

## 2025-07-24 PROCEDURE — 82803 BLOOD GASES ANY COMBINATION: CPT

## 2025-07-24 PROCEDURE — 85014 HEMATOCRIT: CPT

## 2025-07-24 RX ORDER — LISINOPRIL 5 MG/1
20 TABLET ORAL DAILY
Refills: 0 | Status: DISCONTINUED | OUTPATIENT
Start: 2025-07-24 | End: 2025-07-25

## 2025-07-24 RX ORDER — APIXABAN 5 MG/1
5 TABLET, FILM COATED ORAL
Refills: 0 | Status: DISCONTINUED | OUTPATIENT
Start: 2025-07-24 | End: 2025-07-25

## 2025-07-24 RX ORDER — DRONEDARONE 400 MG/1
400 TABLET, FILM COATED ORAL
Refills: 0 | Status: DISCONTINUED | OUTPATIENT
Start: 2025-07-24 | End: 2025-07-25

## 2025-07-24 RX ORDER — AMLODIPINE BESYLATE 10 MG/1
5 TABLET ORAL DAILY
Refills: 0 | Status: DISCONTINUED | OUTPATIENT
Start: 2025-07-24 | End: 2025-07-25

## 2025-07-24 RX ORDER — ACETAMINOPHEN 500 MG/5ML
1000 LIQUID (ML) ORAL ONCE
Refills: 0 | Status: COMPLETED | OUTPATIENT
Start: 2025-07-24 | End: 2025-07-24

## 2025-07-24 RX ADMIN — Medication 400 MILLIGRAM(S): at 19:16

## 2025-07-24 RX ADMIN — DRONEDARONE 400 MILLIGRAM(S): 400 TABLET, FILM COATED ORAL at 22:00

## 2025-07-24 RX ADMIN — Medication 1000 MILLIGRAM(S): at 20:00

## 2025-07-24 RX ADMIN — APIXABAN 5 MILLIGRAM(S): 5 TABLET, FILM COATED ORAL at 22:00

## 2025-07-25 ENCOUNTER — TRANSCRIPTION ENCOUNTER (OUTPATIENT)
Age: 65
End: 2025-07-25

## 2025-07-25 VITALS — TEMPERATURE: 98 F

## 2025-07-25 PROCEDURE — 84132 ASSAY OF SERUM POTASSIUM: CPT

## 2025-07-25 PROCEDURE — 85610 PROTHROMBIN TIME: CPT

## 2025-07-25 PROCEDURE — 85014 HEMATOCRIT: CPT

## 2025-07-25 PROCEDURE — 82947 ASSAY GLUCOSE BLOOD QUANT: CPT

## 2025-07-25 PROCEDURE — 80048 BASIC METABOLIC PNL TOTAL CA: CPT

## 2025-07-25 PROCEDURE — 86900 BLOOD TYPING SEROLOGIC ABO: CPT

## 2025-07-25 PROCEDURE — 86850 RBC ANTIBODY SCREEN: CPT

## 2025-07-25 PROCEDURE — 93005 ELECTROCARDIOGRAM TRACING: CPT

## 2025-07-25 PROCEDURE — 86901 BLOOD TYPING SEROLOGIC RH(D): CPT

## 2025-07-25 PROCEDURE — 85730 THROMBOPLASTIN TIME PARTIAL: CPT

## 2025-07-25 PROCEDURE — 85347 COAGULATION TIME ACTIVATED: CPT

## 2025-07-25 PROCEDURE — C9399: CPT

## 2025-07-25 PROCEDURE — 84295 ASSAY OF SERUM SODIUM: CPT

## 2025-07-25 PROCEDURE — 82803 BLOOD GASES ANY COMBINATION: CPT

## 2025-07-25 PROCEDURE — 82330 ASSAY OF CALCIUM: CPT

## 2025-07-25 PROCEDURE — 85027 COMPLETE CBC AUTOMATED: CPT

## 2025-07-25 PROCEDURE — 82565 ASSAY OF CREATININE: CPT

## 2025-07-25 RX ADMIN — DRONEDARONE 400 MILLIGRAM(S): 400 TABLET, FILM COATED ORAL at 09:30

## 2025-07-25 RX ADMIN — AMLODIPINE BESYLATE 5 MILLIGRAM(S): 10 TABLET ORAL at 09:30

## 2025-07-25 RX ADMIN — LISINOPRIL 20 MILLIGRAM(S): 5 TABLET ORAL at 10:17

## 2025-07-25 RX ADMIN — APIXABAN 5 MILLIGRAM(S): 5 TABLET, FILM COATED ORAL at 09:30

## 2025-08-01 DIAGNOSIS — I48.0 PAROXYSMAL ATRIAL FIBRILLATION: ICD-10-CM

## 2025-08-01 DIAGNOSIS — I10 ESSENTIAL (PRIMARY) HYPERTENSION: ICD-10-CM

## 2025-08-01 DIAGNOSIS — Z79.01 LONG TERM (CURRENT) USE OF ANTICOAGULANTS: ICD-10-CM

## 2025-08-01 DIAGNOSIS — Z79.899 OTHER LONG TERM (CURRENT) DRUG THERAPY: ICD-10-CM
